# Patient Record
Sex: MALE | Race: WHITE | NOT HISPANIC OR LATINO | Employment: UNEMPLOYED | URBAN - METROPOLITAN AREA
[De-identification: names, ages, dates, MRNs, and addresses within clinical notes are randomized per-mention and may not be internally consistent; named-entity substitution may affect disease eponyms.]

---

## 2021-04-06 ENCOUNTER — OFFICE VISIT (OUTPATIENT)
Dept: URGENT CARE | Facility: CLINIC | Age: 31
End: 2021-04-06

## 2021-04-06 VITALS
DIASTOLIC BLOOD PRESSURE: 90 MMHG | TEMPERATURE: 97.8 F | HEART RATE: 136 BPM | RESPIRATION RATE: 16 BRPM | BODY MASS INDEX: 30.67 KG/M2 | OXYGEN SATURATION: 96 % | WEIGHT: 239 LBS | SYSTOLIC BLOOD PRESSURE: 130 MMHG | HEIGHT: 74 IN

## 2021-04-06 DIAGNOSIS — L50.9 URTICARIA: ICD-10-CM

## 2021-04-06 PROCEDURE — 99203 OFFICE O/P NEW LOW 30 MIN: CPT | Mod: 25 | Performed by: PHYSICIAN ASSISTANT

## 2021-04-06 RX ORDER — DIPHENHYDRAMINE HCL 25 MG
25 CAPSULE ORAL EVERY 6 HOURS PRN
COMMUNITY

## 2021-04-06 RX ORDER — METHYLPREDNISOLONE 4 MG/1
TABLET ORAL
Qty: 21 TABLET | Refills: 0 | Status: SHIPPED | OUTPATIENT
Start: 2021-04-06

## 2021-04-06 RX ORDER — CLONAZEPAM 1 MG/1
TABLET ORAL 2 TIMES DAILY
COMMUNITY

## 2021-04-06 RX ORDER — METHYLPREDNISOLONE SODIUM SUCCINATE 125 MG/2ML
125 INJECTION, POWDER, LYOPHILIZED, FOR SOLUTION INTRAMUSCULAR; INTRAVENOUS ONCE
Status: COMPLETED | OUTPATIENT
Start: 2021-04-06 | End: 2021-04-06

## 2021-04-06 RX ADMIN — METHYLPREDNISOLONE SODIUM SUCCINATE 125 MG: 125 INJECTION, POWDER, LYOPHILIZED, FOR SOLUTION INTRAMUSCULAR; INTRAVENOUS at 17:24

## 2021-04-06 ASSESSMENT — ENCOUNTER SYMPTOMS
FATIGUE: 0
TINGLING: 0
FEVER: 0
SORE THROAT: 0
FOCAL WEAKNESS: 0
WHEEZING: 0
VOMITING: 0
SHORTNESS OF BREATH: 0
NAUSEA: 0
SENSORY CHANGE: 0
CHILLS: 0
COUGH: 0
DIARRHEA: 0

## 2021-04-07 NOTE — PROGRESS NOTES
"Subjective:      Herbert Lion is a 31 y.o. male who presents with Rash (hives, gotten worse in the last couple of days, started 6-7 weeks ago slowly got worse)            Rash  This is a new problem. Episode onset: 6-7 weeks  The rash is diffuse. The rash is characterized by itchiness, redness and swelling. It is unknown if there was an exposure to a precipitant. Pertinent negatives include no cough, diarrhea, fatigue, fever, shortness of breath, sore throat or vomiting. Past treatments include antihistamine. The treatment provided mild relief.       No past medical history on file.    No past surgical history on file.    No family history on file.    No Known Allergies    Medications, Allergies, and current problem list reviewed today in Epic    Review of Systems   Constitutional: Negative for chills, fatigue, fever and malaise/fatigue.   HENT: Negative for sore throat.    Respiratory: Negative for cough, shortness of breath and wheezing.    Gastrointestinal: Negative for diarrhea, nausea and vomiting.   Skin: Positive for itching and rash.   Neurological: Negative for tingling, sensory change and focal weakness.     All other systems reviewed and are negative.        Objective:     /90 (BP Location: Left arm, Patient Position: Sitting, BP Cuff Size: Adult long)   Pulse (!) 136   Temp 36.6 °C (97.8 °F) (Temporal)   Resp 16   Ht 1.88 m (6' 2\")   Wt 108 kg (239 lb)   SpO2 96%   BMI 30.69 kg/m²      Physical Exam  Constitutional:       General: He is not in acute distress.  HENT:      Head: Normocephalic and atraumatic.   Eyes:      Conjunctiva/sclera: Conjunctivae normal.   Cardiovascular:      Rate and Rhythm: Tachycardia present.   Pulmonary:      Effort: Pulmonary effort is normal. No respiratory distress.   Musculoskeletal:         General: Normal range of motion.   Skin:     General: Skin is warm and dry.      Findings: Rash present. Rash is urticarial.          Neurological:      General: No focal " deficit present.      Mental Status: He is alert and oriented to person, place, and time.   Psychiatric:         Mood and Affect: Mood is anxious (moderately anxious).         Behavior: Behavior normal.         Thought Content: Thought content normal.         Judgment: Judgment normal.                 Assessment/Plan:        1. Urticaria    - methylPREDNISolone sod succ (SOLU-MEDROL) 125 MG injection 125 mg  - methylPREDNISolone (MEDROL DOSEPAK) 4 MG Tablet Therapy Pack; Follow schedule on package instructions.  Dispense: 21 tablet; Refill: 0 START TOMORROW  Continue OTC Benadryl prn.    -  AVS printed and given to patient  with home care instructions and red flags and warning signs that warrant ER evaluation or immediate follow-up.    Differential diagnoses, Supportive care, and indications for immediate follow-up discussed with patient.   Pathogenesis of diagnosis discussed including typical length and natural progression.   Instructed to return to clinic or nearest emergency department for any change in condition, further concerns, or worsening of symptoms.    The patient demonstrated a good understanding and agreed with the treatment plan.    Luz Burton P.A.-C.

## 2021-04-07 NOTE — PATIENT INSTRUCTIONS
Hives  Hives are itchy, red, swollen areas on your skin. Hives can show up on any part of your body. Hives often fade within 24 hours (acute hives). New hives can show up after old ones fade. This can go on for many days or weeks (chronic hives). Hives do not spread from person to person (are not contagious).  Hives are caused by your body's response to something that you are allergic to (allergen). These are sometimes called triggers. You can get hives right after being around a trigger, or hours later.  What are the causes?  · Allergies to foods.  · Insect bites or stings.  · Pollen.  · Pets.  · Latex.  · Chemicals.  · Spending time in sunlight, heat, or cold.  · Exercise.  · Stress.  · Some medicines.  · Viruses. This includes the common cold.  · Infections caused by germs (bacteria).  · Allergy shots.  · Blood transfusions.  Sometimes, the cause is not known.  What increases the risk?  · Being a woman.  · Being allergic to foods such as:  ? Citrus fruits.  ? Milk.  ? Eggs.  ? Peanuts.  ? Tree nuts.  ? Shellfish.  · Being allergic to:  ? Medicines.  ? Latex.  ? Insects.  ? Animals.  ? Pollen.  What are the signs or symptoms?    · Raised, itchy, red or white bumps or patches on your skin. These areas may:  ? Get large and swollen.  ? Change in shape and location.  ? Stand alone or connect to each other over a large area of skin.  ? Sting or hurt.  ? Turn white when pressed in the center (malcolm).  In very bad cases, your hands, feet, and face may also get swollen. This may happen if hives start deeper in your skin.  How is this treated?  Treatment for this condition depends on your symptoms. Treatment may include:  · Using cool, wet cloths (cool compresses) or taking cool showers to stop the itching.  · Medicines that help:  ? Relieve itching (antihistamines).  ? Reduce swelling (corticosteroids).  ? Treat infection (antibiotics).  · A medicine (omalizumab) that is given as a shot (injection). Your doctor may  prescribe this if you have hives that do not get better even after other treatments.  · In very bad cases, you may need a shot of a medicine called epinephrineto prevent a life-threatening allergic reaction (anaphylaxis).  Follow these instructions at home:  Medicines  · Take or apply over-the-counter and prescription medicines only as told by your doctor.  · If you were prescribed an antibiotic medicine, use it as told by your doctor. Do not stop using it even if you start to feel better.  Skin care  · Apply cool, wet cloths to the hives.  · Do not scratch your skin. Do not rub your skin.  General instructions  · Do not take hot showers or baths. This can make itching worse.  · Do not wear tight clothes.  · Use sunscreen and wear clothes that cover your skin when you are outside.  · Avoid any triggers that cause your hives. Keep a journal to help track what causes your hives. Write down:  ? What medicines you take.  ? What you eat and drink.  ? What products you use on your skin.  · Keep all follow-up visits as told by your doctor. This is important.  Contact a doctor if:  · Your symptoms are not better with medicine.  · Your joints hurt or are swollen.  Get help right away if:  · You have a fever.  · You have pain in your belly (abdomen).  · Your tongue or lips are swollen.  · Your eyelids are swollen.  · Your chest or throat feels tight.  · You have trouble breathing or swallowing.  These symptoms may be an emergency. Do not wait to see if the symptoms will go away. Get medical help right away. Call your local emergency services (911 in the [place] Do not drive yourself to the hospital.  Summary  · Hives are itchy, red, swollen areas on your skin.  · Treatment for this condition depends on your symptoms.  · Avoid things that cause your hives. Keep a journal to help track what causes your hives.  · Take and apply over-the-counter and prescription medicines only as told by your doctor.  · Keep all follow-up visits  as told by your doctor. This is important.  This information is not intended to replace advice given to you by your health care provider. Make sure you discuss any questions you have with your health care provider.  Document Released: 09/26/2009 Document Revised: 07/03/2019 Document Reviewed: 07/03/2019  [employer] Patient Education © 2020 [employer]